# Patient Record
Sex: MALE | Race: BLACK OR AFRICAN AMERICAN | NOT HISPANIC OR LATINO | Employment: FULL TIME | URBAN - METROPOLITAN AREA
[De-identification: names, ages, dates, MRNs, and addresses within clinical notes are randomized per-mention and may not be internally consistent; named-entity substitution may affect disease eponyms.]

---

## 2017-12-20 ENCOUNTER — HOSPITAL ENCOUNTER (EMERGENCY)
Facility: HOSPITAL | Age: 52
Discharge: HOME | End: 2017-12-20
Payer: COMMERCIAL

## 2017-12-20 VITALS
TEMPERATURE: 98 F | RESPIRATION RATE: 18 BRPM | WEIGHT: 241 LBS | SYSTOLIC BLOOD PRESSURE: 129 MMHG | HEIGHT: 72 IN | OXYGEN SATURATION: 99 % | DIASTOLIC BLOOD PRESSURE: 78 MMHG | BODY MASS INDEX: 32.64 KG/M2 | HEART RATE: 107 BPM

## 2017-12-20 DIAGNOSIS — M70.31 BURSITIS OF RIGHT ELBOW, UNSPECIFIED BURSA: Primary | ICD-10-CM

## 2017-12-20 DIAGNOSIS — M25.421 ELBOW SWELLING, RIGHT: ICD-10-CM

## 2017-12-20 LAB
ALBUMIN SERPL BCP-MCNC: 4.8 G/DL
ALP SERPL-CCNC: 81 U/L
ALT SERPL W/O P-5'-P-CCNC: 48 U/L
ANION GAP SERPL CALC-SCNC: 15 MMOL/L
AST SERPL-CCNC: 30 U/L
BASOPHILS # BLD AUTO: 0.03 K/UL
BASOPHILS NFR BLD: 0.3 %
BILIRUB SERPL-MCNC: 0.8 MG/DL
BUN SERPL-MCNC: 12 MG/DL
CALCIUM SERPL-MCNC: 9.7 MG/DL
CHLORIDE SERPL-SCNC: 100 MMOL/L
CO2 SERPL-SCNC: 27 MMOL/L
CREAT SERPL-MCNC: 1.11 MG/DL
DIFFERENTIAL METHOD: ABNORMAL
EOSINOPHIL # BLD AUTO: 0 K/UL
EOSINOPHIL NFR BLD: 0.4 %
ERYTHROCYTE [DISTWIDTH] IN BLOOD BY AUTOMATED COUNT: 12.5 %
EST. GFR  (AFRICAN AMERICAN): >60 ML/MIN/1.73 M^2
EST. GFR  (NON AFRICAN AMERICAN): >60 ML/MIN/1.73 M^2
GLUCOSE SERPL-MCNC: 109 MG/DL
HCT VFR BLD AUTO: 44.4 %
HGB BLD-MCNC: 15.1 G/DL
LYMPHOCYTES # BLD AUTO: 1.6 K/UL
LYMPHOCYTES NFR BLD: 15.5 %
MCH RBC QN AUTO: 30.4 PG
MCHC RBC AUTO-ENTMCNC: 34 G/DL
MCV RBC AUTO: 89 FL
MONOCYTES # BLD AUTO: 1.1 K/UL
MONOCYTES NFR BLD: 11.1 %
NEUTROPHILS # BLD AUTO: 7.3 K/UL
NEUTROPHILS NFR BLD: 72.4 %
PLATELET # BLD AUTO: 361 K/UL
PMV BLD AUTO: 8.4 FL
POTASSIUM SERPL-SCNC: 4.1 MMOL/L
PROT SERPL-MCNC: 8.5 G/DL
RBC # BLD AUTO: 4.97 M/UL
SODIUM SERPL-SCNC: 142 MMOL/L
WBC # BLD AUTO: 10.05 K/UL

## 2017-12-20 PROCEDURE — 99284 EMERGENCY DEPT VISIT MOD MDM: CPT

## 2017-12-20 PROCEDURE — 80053 COMPREHEN METABOLIC PANEL: CPT

## 2017-12-20 PROCEDURE — 85025 COMPLETE CBC W/AUTO DIFF WBC: CPT

## 2017-12-20 RX ORDER — INDOMETHACIN 50 MG/1
50 CAPSULE ORAL 3 TIMES DAILY
Qty: 20 CAPSULE | Refills: 0 | Status: SHIPPED | OUTPATIENT
Start: 2017-12-20

## 2017-12-20 NOTE — ED PROVIDER NOTES
Encounter Date: 12/20/2017       History     Chief Complaint   Patient presents with    Arm Pain     Pt states has history of cellulitis, states right arm became warm and painful with swelling x 2 days ago.  No obvious deformity noted.  Pt states right arm tender to touch.  denies trauma.  Pt noted with + ROM of right hand and arm.      52-year-old male presents to emergency room with right elbow pain and swelling for the past 2-3 days.  Patient states he is being evaluated by primary care doctors to find the cause of ongoing joint swelling.  Patient states the swelling initially started his right leg which lasted for several weeks that improved.  A few weeks later he began experiencing swelling in his left arm that lasted approximately 2 weeks before improving and now has swelling in his right arm.  Patient denies any injury.  States he has increased pain with movement of the right elbow.  No numbness or tingling in upper extremities.  No weakness.  Denies any history of arthritis or gout.           Review of patient's allergies indicates:  No Known Allergies  Past Medical History:   Diagnosis Date    Hypertension      History reviewed. No pertinent surgical history.  History reviewed. No pertinent family history.  Social History   Substance Use Topics    Smoking status: Never Smoker    Smokeless tobacco: Not on file    Alcohol use No     Review of Systems   Constitutional: Negative for fever.   HENT: Negative for sore throat.    Respiratory: Negative for shortness of breath.    Cardiovascular: Negative for chest pain.   Gastrointestinal: Negative for nausea.   Genitourinary: Negative for dysuria.   Musculoskeletal: Positive for joint swelling. Negative for back pain.        R arm swelling   Skin: Negative for rash.   Neurological: Negative for weakness.   Hematological: Does not bruise/bleed easily.   All other systems reviewed and are negative.      Physical Exam     Initial Vitals [12/20/17 1234]   BP Pulse  Resp Temp SpO2   129/78 107 18 98.3 °F (36.8 °C) 99 %      MAP       95         Physical Exam    Nursing note and vitals reviewed.  Constitutional: He appears well-developed and well-nourished. No distress.   HENT:   Head: Normocephalic.   Neck: Normal range of motion. Neck supple.   Cardiovascular: Normal rate.   Pulmonary/Chest: Breath sounds normal. No respiratory distress.   Musculoskeletal:        Right elbow: He exhibits decreased range of motion and swelling. He exhibits no effusion and no deformity. Tenderness found. Medial epicondyle tenderness noted.   Neurological: He is alert and oriented to person, place, and time.   Skin: Skin is warm. Capillary refill takes less than 2 seconds.   Psychiatric: He has a normal mood and affect. His behavior is normal. Judgment and thought content normal.         ED Course   Procedures  Labs Reviewed   CBC W/ AUTO DIFFERENTIAL   COMPREHENSIVE METABOLIC PANEL     Imaging Results          X-Ray Elbow Complete Right (Final result)  Result time 12/20/17 13:07:12    Final result by TINY Eaton Sr., MD (12/20/17 13:07:12)                 Impression:         Normal study.      Electronically signed by: TINY EATON MD  Date:     12/20/17  Time:    13:07              Narrative:    3 view x-ray of the right elbow    Clinical History:     Effusion, right elbow    Findings:     There is no fracture. There is no dislocation.                                      Medical Decision Making:   Initial Assessment:   52-year-old male presents to emergency room with right elbow pain and swelling for the past 2-3 days.  Patient states he is being evaluated by primary care doctors to find the cause of ongoing joint swelling.  Patient states the swelling initially started his right leg which lasted for several weeks that improved.  A few weeks later he began experiencing swelling in his left arm that lasted approximately 2 weeks before improving and now has swelling in his right arm.  Patient  denies any injury.  States he has increased pain with movement of the right elbow.  No numbness or tingling in upper extremities.  No weakness.  Denies any history of arthritis or gout.  Patient has some tenderness to the medial epicondyle of the right elbow.  Range of motion of the elbow is limited due to pain.  There is minimal swelling noted to the right arm.  No erythema or warmth.  Patient has strong radial pulse.  Differential Diagnosis:   Arthritis, bursitis, tendinitis, cellulitis, DVT, fracture, foreign body, contusion  Clinical Tests:   Lab Tests: Ordered and Reviewed  Radiological Study: Ordered and Reviewed  ED Management:  I'll discharge patient home with anti-inflammatory medication.  Instructed to ice the area and rest.  If symptoms worsen return to the emergency department.  Labs unremarkable.  X-ray is unremarkable.                   ED Course      Clinical Impression:   The primary encounter diagnosis was Bursitis of right elbow, unspecified bursa. A diagnosis of Elbow swelling, right was also pertinent to this visit.                           Seema Garcia NP  12/20/17 2239